# Patient Record
(demographics unavailable — no encounter records)

---

## 2017-03-02 NOTE — EDM.PDOC
ED HPI GI/ABDOMINAL





- General


Chief Complaint: Abdominal Pain


Stated Complaint: POSSIBLE INFECTED GLADBLADDER


Time Seen by Provider: 03/02/17 16:00


Source of Information: Reports: Patient, Old records


History Limitations: Reports: No limitations





- History of Present Illness


INITIAL COMMENTS - FREE TEXT/NARRATIVE: 


HISTORY AND PHYSICAL:





History of present illness:


[Patient comes to the emergency room at the request of Brittny Cedeño NP at 

[facility] where patient presented with diffuse abdominal pain for the 

past couple of days. She had an ultrasound of her abdomen which showed gall 

bladder thickening and inflammation. Surgeon was contacted by the clinic who 

recommended patient followup in the ER for blood work and abdominal CT scan. 

Patient shows up approximately 4 hours after leaving the clinic for evaluation 

in the ER.


She's had diffuse abdominal pain which is worse in the right upper quadrant. She

's been feeling ill on and off for the past several weeks but really worsened 

over the past couple of days. She's not had fever or chills. She was prescribed 

Zofran this morning which greatly improved her feelings of nausea. She has 

taken small sips of water since she's been home the clinic today but has had no 

food. One episode of diarrhea today. Denies burning with urination, urinary 

frequency and hematuria.]





Review of systems: 


As per history of present illness and below otherwise all systems reviewed and 

negative.





Past medical history: 


As per history of present illness and as reviewed below otherwise 

noncontributory.





Surgical history: 


As per history of present illness and as reviewed below otherwise 

noncontributory.





Social history: 


No reported history of drug or alcohol abuse.





Family history: 


As per history of present illness and as reviewed below otherwise 

noncontributory.





Physical exam:


HEENT: Atraumatic, normocephalic. Oral mucous membranes are pink and moist. 

throat clear, neck supple, no lymphadenopathy.


Lungs: Clear to auscultation, no wheezing, crackles or rales. breath sounds 

equal bilaterally.


Heart: S1S2, regular rate and rhythm., negative for clicks, rubs, or JVD.


Abdomen: Bowel sounds are normoactive throughout. Abdomen is soft and 

nondistended. Diffuse abdominal tenderness with palpation significantly worse 

to right upper quadrant. Positive Lopez's sign.  Negative for costovertebral 

tenderness. No guarding masses or rebound.


Pelvis: Stable nontender.


Genitourinary: Deferred.


Rectal: Deferred.


Extremities: Atraumatic, negative for cords or calf pain. Cyanosis or edema to 

feet or lower legs. Neurovascular unremarkable.


Neuro: Awake, alert, oriented.  Motor and sensory unremarkable throughout. Exam 

nonfocal.





Diagnostics:


[CBC, CMP, urinalysis, amylase, lipase, urine pregnancy, CT abdomen and pelvis]





Impression: 


[] Right upper Quadrant abdominal pain


UTI]





Plan:


[Labs and CT report are reviewed with patient and Dr. Lyubov Goodman. Abdominal 

CT shows a significantly distended gallbladder and a dilated cystic duct 

measuring up to 1.5 cm. Dr. Lombardo recommends HIDA scan as an outpatient 

tomorrow followup with her in clinic next week. Will start patient on Cipro 500 

mg twice a day for the next 7 days for UTI. Patient is in agreement with today'

s plan. All questions are answered and concerns are addressed.]





Definitive disposition and diagnosis as appropriate pending reevaluation and 

review of above.











- Related Data


Allergies/ADRs: 


 Allergies











Allergy/AdvReac Type Severity Reaction Status Date / Time


 


aspirin Allergy  Muscle Verified 03/02/17 15:58





   Weakness  











Home Meds: 


 Home Meds





. [No Known Home Meds]  12/17/14 [History]











Past Medical History





- Past Health History


Medical/Surgical History: Denies Medical/Surgical History


HEENT History: Reports: None


Cardiovascular History: Reports: None


Respiratory History: Reports: None


Gastrointestinal History: Reports: None


Genitourinary History: Reports: None


OB/GYN History: Reports: None


Musculoskeletal History: Reports: None


Neurological History: Reports: None


Psychiatric History: Reports: None


Endocrine/Metabolic History: Reports: None


Hematologic History: Reports: None


Immunologic History: Reports: None


Oncologic (Cancer) History: Reports: None


Dermatologic History: Reports: None





- Infectious Disease History


Infectious Disease History: Reports: Chicken pox, Hepatitis A





- Past Surgical History


Head Surgeries/Procedures: Reports: None





Social & Family History





- Family History


Family Medical History: Noncontributory





- Tobacco Use


Smoking Status *Q: Current Some Day Smoker


Years of Tobacco use: 8


Packs/Tins Daily: 0.2


Second Hand Smoke Exposure: Yes





- Caffeine Use


Caffeine Use: Reports: None





- Alcohol Use


Days Per Week of Alcohol Use: 2


Number of Drinks Per Day: 4


Total Drinks Per Week: 8





- Recreational Drug Use


Recreational Drug Use: No





ED ROS GENERAL





- Review of Systems


Review Of Systems: ROS reveals no pertinent complaints other than HPI.





ED EXAM, GI/ABD





- Physical Exam


Exam: See Below





Course





- Vital Signs


Last Recorded V/S: 


 Last Vital Signs











Temp  98.7 F   03/02/17 19:16


 


Pulse  73   03/02/17 19:16


 


Resp  16   03/02/17 19:16


 


BP  117/79   03/02/17 19:16


 


Pulse Ox  99   03/02/17 19:16














- Orders/Labs/Meds


Orders: 


 Active Orders 24 hr











 Category Date Time Status


 


 Abdomen Pelvis w Cont [CT] Stat Exams  03/02/17 16:00 Taken


 


 Saline Lock Insert [OM.PC] Stat Oth  03/02/17 15:59 Ordered











Labs: 


 Laboratory Tests











  03/02/17 03/02/17 03/02/17 Range/Units





  16:10 16:10 16:29 


 


WBC    5.14  (4.0-11.0)  K/uL


 


RBC    4.61  (4.30-5.90)  M/uL


 


Hgb    13.1  (12.0-16.0)  g/dL


 


Hct    39.7  (36.0-46.0)  %


 


MCV    86.1  (80.0-98.0)  fL


 


MCH    28.4  (27.0-32.0)  pg


 


MCHC    33.0  (31.0-37.0)  g/dL


 


RDW Std Deviation    40.1  (28.0-62.0)  fl


 


RDW Coeff of Thompson    13  (11.0-15.0)  %


 


Plt Count    167  (150-400)  K/uL


 


MPV    11.40  (7.40-12.00)  fL


 


Neut % (Auto)    63.3  (48.0-80.0)  %


 


Lymph % (Auto)    28.0  (16.0-40.0)  %


 


Mono % (Auto)    6.8  (0.0-15.0)  %


 


Eos % (Auto)    1.9  (0.0-7.0)  %


 


Baso % (Auto)    0.0  (0.0-1.5)  %


 


Neut #    3.3  (1.4-5.7)  K/uL


 


Lymph #    1.4  (0.6-2.4)  K/uL


 


Mono #    0.4  (0.0-0.8)  K/uL


 


Eos #    0.1  (0.0-0.7)  K/uL


 


Baso #    0.0  (0.0-0.1)  K/uL


 


Nucleated RBC %    0.0  /100WBC


 


Nucleated RBCs #    0  K/uL


 


Sodium     (136-146)  mmol/L


 


Potassium     (3.5-5.1)  mmol/L


 


Chloride     ()  mmol/L


 


Carbon Dioxide     (21-31)  mmol/L


 


BUN     (6.0-23.0)  mg/dL


 


Creatinine     (0.6-1.5)  mg/dL


 


Est Cr Clr Drug Dosing     mL/min


 


Estimated GFR (MDRD)     ml/min


 


Glucose     ()  mg/dL


 


Calcium     (8.8-10.8)  mg/dL


 


Total Bilirubin     (0.1-1.5)  mg/dL


 


AST     (5-40)  IU/L


 


ALT     (8-54)  IU/L


 


Alkaline Phosphatase     ()  


 


Total Protein     (6.0-8.0)  g/dL


 


Albumin     (3.5-5.0)  g/dL


 


Globulin     (2.0-3.5)  g/dL


 


Albumin/Globulin Ratio     (1.3-2.8)  


 


Amylase     (10-90)  U/L


 


Lipase     (7-80)  U/L


 


Urine Color   YELLOW   


 


Urine Appearance   CLEAR   


 


Urine pH   7.0   (5.0-8.0)  


 


Ur Specific Gravity   1.020   (1.001-1.035)  


 


Urine Protein   NEGATIVE   (NEGATIVE)  mg/dL


 


Urine Glucose (UA)   NEGATIVE   (NEGATIVE)  mg/dL


 


Urine Ketones   NEGATIVE   (NEGATIVE)  mg/dL


 


Urine Occult Blood   MODERATE   (NEGATIVE)  


 


Urine Nitrite   NEGATIVE   (NEGATIVE)  


 


Urine Bilirubin   NEGATIVE   (NEGATIVE)  


 


Urine Urobilinogen   2.0 H   (<2.0)  EU/dL


 


Ur Leukocyte Esterase   NEGATIVE   (NEGATIVE)  


 


Urine RBC   0-2   (0-2/HPF)  


 


Urine WBC   1-2   (0-5/HPF)  


 


Ur Epithelial Cells   MODERATE   (NONE-FEW)  


 


Urine Bacteria   2+ H   (NEGATIVE)  


 


Urine HCG, Qual  NEGATIVE    (NEGATIVE)  














  03/02/17 Range/Units





  16:29 


 


WBC   (4.0-11.0)  K/uL


 


RBC   (4.30-5.90)  M/uL


 


Hgb   (12.0-16.0)  g/dL


 


Hct   (36.0-46.0)  %


 


MCV   (80.0-98.0)  fL


 


MCH   (27.0-32.0)  pg


 


MCHC   (31.0-37.0)  g/dL


 


RDW Std Deviation   (28.0-62.0)  fl


 


RDW Coeff of Thompson   (11.0-15.0)  %


 


Plt Count   (150-400)  K/uL


 


MPV   (7.40-12.00)  fL


 


Neut % (Auto)   (48.0-80.0)  %


 


Lymph % (Auto)   (16.0-40.0)  %


 


Mono % (Auto)   (0.0-15.0)  %


 


Eos % (Auto)   (0.0-7.0)  %


 


Baso % (Auto)   (0.0-1.5)  %


 


Neut #   (1.4-5.7)  K/uL


 


Lymph #   (0.6-2.4)  K/uL


 


Mono #   (0.0-0.8)  K/uL


 


Eos #   (0.0-0.7)  K/uL


 


Baso #   (0.0-0.1)  K/uL


 


Nucleated RBC %   /100WBC


 


Nucleated RBCs #   K/uL


 


Sodium  141  (136-146)  mmol/L


 


Potassium  3.8  (3.5-5.1)  mmol/L


 


Chloride  113 H  ()  mmol/L


 


Carbon Dioxide  20 L  (21-31)  mmol/L


 


BUN  15  (6.0-23.0)  mg/dL


 


Creatinine  0.7  (0.6-1.5)  mg/dL


 


Est Cr Clr Drug Dosing  109.01  mL/min


 


Estimated GFR (MDRD)  > 60.0  ml/min


 


Glucose  96  ()  mg/dL


 


Calcium  8.8  (8.8-10.8)  mg/dL


 


Total Bilirubin  0.4  (0.1-1.5)  mg/dL


 


AST  13  (5-40)  IU/L


 


ALT  16  (8-54)  IU/L


 


Alkaline Phosphatase  79  ()  


 


Total Protein  7.0  (6.0-8.0)  g/dL


 


Albumin  4.0  (3.5-5.0)  g/dL


 


Globulin  3.0  (2.0-3.5)  g/dL


 


Albumin/Globulin Ratio  1.3  (1.3-2.8)  


 


Amylase  38  (10-90)  U/L


 


Lipase  17  (7-80)  U/L


 


Urine Color   


 


Urine Appearance   


 


Urine pH   (5.0-8.0)  


 


Ur Specific Gravity   (1.001-1.035)  


 


Urine Protein   (NEGATIVE)  mg/dL


 


Urine Glucose (UA)   (NEGATIVE)  mg/dL


 


Urine Ketones   (NEGATIVE)  mg/dL


 


Urine Occult Blood   (NEGATIVE)  


 


Urine Nitrite   (NEGATIVE)  


 


Urine Bilirubin   (NEGATIVE)  


 


Urine Urobilinogen   (<2.0)  EU/dL


 


Ur Leukocyte Esterase   (NEGATIVE)  


 


Urine RBC   (0-2/HPF)  


 


Urine WBC   (0-5/HPF)  


 


Ur Epithelial Cells   (NONE-FEW)  


 


Urine Bacteria   (NEGATIVE)  


 


Urine HCG, Qual   (NEGATIVE)  











Meds: 


Medications














Discontinued Medications














Generic Name Dose Route Start Last Admin





  Trade Name Freq  PRN Reason Stop Dose Admin


 


Iopamidol  100 ml  03/02/17 17:56  03/02/17 17:57





  Isovue-370 (76%)  IV  03/02/17 17:57  100 ml





  ONETIME STA   Administration


 


Sodium Chloride  10 ml  03/02/17 15:59  





  Saline Flush  FLUSH   





  ASDIRECTED PRN   





  Keep Vein Open   


 


Sodium Chloride  2.5 ml  03/02/17 15:59  





  Saline Flush  FLUSH   





  ASDIRECTED PRN   





  Keep Vein Open   














Departure





- Departure


Time of Disposition: 19:00


Disposition: Home, Self-Care 01


Condition: good


Clinical Impression: 


Abdominal pain


Qualifiers:


 Abdominal location: right upper quadrant Qualified Code(s): R10.11 - Right 

upper quadrant pain





UTI (urinary tract infection)


Qualifiers:


 Urinary tract infection type: site unspecified Hematuria presence: without 

hematuria Qualified Code(s): N39.0 - Urinary tract infection, site not specified





Instructions:  Urinary Tract Infection, Adult, Easy-to-Read, Abdominal Pain, 

Adult, Easy-to-Read


Referrals: 


PCP,None [Primary Care Provider] - 


Forms:  ED Department Discharge


Additional Instructions: 


The following information is given to patients seen in the emergency department 

who are being discharged to home. This information is to outline your options 

for follow-up care. We provide all patients seen in our emergency department 

with a follow-up referral.





The need for follow-up, as well as the timing and circumstances, are variable 

depending upon the specifics of your emergency department visit.





If you don't have a primary care physician on staff, we will provide you with a 

referral. We always advise you to contact your personal physician following an 

emergency department visit to inform them of the circumstance of the visit and 

for follow-up with them and/or the need for any referrals to a consulting 

specialist.





The emergency department will also refer you to a specialist when appropriate. 

This referral assures that you have the opportunity for follow-up care with a 

specialist. All of these measure are taken in an effort to provide you with 

optimal care, which includes your follow-up.





Under all circumstances we always encourage you to contact your private 

physician who remains a resource for coordinating your care. When calling for 

follow-up care, please make the office aware that this follow-up is from your 

recent emergency room visit. If for any reason you are refused follow-up, 

please contact the [facility]  emergency 

department at [phone] and asked to speak to the emergency department 

charge nurse.








[facility]


Specialty Care- General Surgery


[address]


[address] [place] [address], [address]


[place] [address]


Phone: [phone]


Fax: [phone]








Call the above listed phone number tomorrow to schedule an appointment to 

followup with Dr. Lyubov Goodman about your gallbladder next week. You have been 

given an outpatient prescription for a HIDA scan. Take this to radiology 

registration desk tomorrow. Take Cipro twice daily as prescribed. Return to ER 

as needed and as discussed.





- My Orders


Last 24 Hours: 


My Active Orders





03/02/17 15:59


Saline Lock Insert [OM.PC] Stat 





03/02/17 16:00


Abdomen Pelvis w Cont [CT] Stat 














- Assessment/Plan


Last 24 Hours: 


My Active Orders





03/02/17 15:59


Saline Lock Insert [OM.PC] Stat 





03/02/17 16:00


Abdomen Pelvis w Cont [CT] Stat

## 2017-03-03 NOTE — CT
EXAM DATE: 17



PATIENT'S AGE: 31



Patient: WALE WHITFIELD



Facility: [place], [place]

Patient ID: [id number]

Site Patient ID: [id number].

Site Accession #: [id number].

: 1985

Study: CT Abdomen/Pelvis [id number]/2/2017 6:00:02 PM

Ordering Physician: Doctor Temp



Final Report: 

INDICATION:

Upper abdominal pain. Right flank pain 



TECHNIQUE:

CT abdomen and pelvis acquired with IV contrast.



COMPARISON:

None available 



FINDINGS:

Lower chest: Unremarkable. 

Liver: An irregular low-attenuation area in the anterior segment of the right 
hepatic lobe, with very subtle slightly increased adjacent density, measuring 
up to 4.8 x 3.5 centimeters on the coronal reformats, not well evaluated. 

Spleen: Unremarkable. 

Pancreas: Unremarkable. 

Gallbladder and bile ducts: A significantly distended gallbladder. Dilated 
cystic duct measuring up to 1.5 centimeters proximally. 

Adrenal glands: Unremarkable. 

Kidneys: Unremarkable. 

GI tract: No high-grade mechanical bowel obstruction. Fluid-filled small bowel 
segments are nonspecific. No evidence of appendicitis. No significant 
pericolonic changes. 

Vascular structures: Unremarkable. 

Lymph nodes: Unremarkable. 

Miscellaneous: Unremarkable. No free air or significant free fluid. 

Pelvic Organs: Unremarkable. 

Bones: Unremarkable for age. 



IMPRESSION:

No evidence of appendicitis, diverticulitis or high-grade mechanical bowel 
obstruction. Fluid-filled small bowel segments are nonspecific. Correlate for 
mild enteritis. 

Significantly distended gallbladder and dilated cystic duct. Recommend further 
sonographic evaluation. 

An ill-defined irregular hepatic lesion, not well evaluated. This could 
represent an FNH, although other etiologies are not excluded. Recommend 
followup evaluation with contrast MRI.



Dictated by Shawn Henderson MD @ 3/2/2017 6:23:07 PM



Dictated by: Shawn Henderson MD @ 2017 18:23:19

(Electronic Signature)



Report Signed by Proxy and Original Signed Document filed in the

Medical Record.
[facility]

## 2017-03-07 NOTE — NM
EXAMINATION: Nuclear medicine hepatobiliary study (HIDA) 

 

HISTORY: Right upper quadrant pain.

 

PROCEDURE:

Following intravenous administration of  3.9  mCi of technetium 99m Choletec, dynamic images were ob
tained up to one-hour post injection.

 

FINDINGS:

The initial dynamic images demonstrates clearance of the tracer from the blood pool with the prompt 
tracer uptake by the liver. By 60 minutes no activity is noted within the gallbladder. There is acti
vity noted within the small bowel by 15 minutes.

 

IMPRESSION:

1. No scintigraphic evidence of a patent cystic duct. Correlate clinically for acute cholecystitis.

## 2017-03-10 NOTE — PCM.PREANE
Preanesthetic Assessment





- ANESTHESIA/TRANSFUSION/FAMILY HX


Anesthesia/Transfusion History: No Prior Anesthesia


Family History of Anesthesia Reaction: No





- PHYSICAL ASSESSMENT


Height: 1.68 m


Weight: 85.729 kg


ASA Class: 1





- ALLERGIES


Allergies/Adverse Reactions: 


 Allergies











Allergy/AdvReac Type Severity Reaction Status Date / Time


 


aspirin Allergy  Tachycardia Verified 03/09/17 08:07














- BLOOD


Blood Available: No





- ANESTHESIA PLAN


Preop Beta Blocker: No


Anesthesia Type Planned: general anesthesia





PreAnesthesia Questionnaire





- Past Health History


Medical/Surgical History: Denies Medical/Surgical History


HEENT History: Reports: None


Cardiovascular History: Reports: None


Respiratory History: Reports: None


Gastrointestinal History: Reports: Chronic diarrhea, GERD


Genitourinary History: Reports: None


OB/GYN History: Reports: None


Musculoskeletal History: Reports: None


Neurological History: Reports: None


Psychiatric History: Reports: None


Endocrine/Metabolic History: Reports: None


Hematologic History: Reports: None


Immunologic History: Reports: None


Oncologic (Cancer) History: Reports: None


Dermatologic History: Reports: None





- Infectious Disease History


Infectious Disease History: Reports: Chicken pox, Hepatitis A





- Past Surgical History


Head Surgeries/Procedures: Reports: None





- SUBSTANCE USE


Smoking Status *Q: Current Every Day Smoker


Tobacco Use Within Last Twelve Months: Cigarettes


Second Hand Smoke Exposure: Yes


Days Per Week of Alcohol Use: 2


Number of Drinks Per Day: 4


Total Drinks Per Week: 8


Recreational Drug Use History: No





- HOME MEDS


Home Medications: 


 Home Meds





Ondansetron [Zofran ODT] 4 mg SL ASDIRECTED PRN 03/09/17 [History]

## 2017-03-13 NOTE — PCM.PREANE
Preanesthetic Assessment





- ANESTHESIA/TRANSFUSION/FAMILY HX


Anesthesia/Transfusion History: No Prior Anesthesia


Family History of Anesthesia Reaction: No


Other Intubation History Comment: no history of intubation





- REVIEW OF SYSTEMS


Constitutional: Reports: no symptoms


CNS: Reports: no symptoms


Respiratory: Reports: no symptoms


Cardiovascular: Reports: no symptoms


Other: Reports: none





- PHYSICAL ASSESSMENT


O2 Sat by Pulse Oximetry: 99


RR: 16


Vital Signs: 





 Last Vital Signs











Temp  37.1 C   03/13/17 06:49


 


Pulse  74   03/13/17 06:49


 


Resp  16   03/13/17 06:49


 


BP  120/58 L  03/13/17 06:49


 


Pulse Ox  99   03/13/17 06:49











Height: 1.68 m


Weight: 85.729 kg


ASA Class: 2


Mental Status: alert & oriented x3


Airway Class: Mallampati = 2


Dentition: Reports: normal dentition


Thyro-Mental Finger Breadths: 3


Mouth Opening Finger Breadths: 3


ROM/Head Extension: full


Respiratory Status: lungs clear to auscultation bilaterally


Cardiovascular Status: regular rate & rhythm, normal S1, S2, no murmur, blood 

pressure WNL





- ALLERGIES


Allergies/Adverse Reactions: 


 Allergies











Allergy/AdvReac Type Severity Reaction Status Date / Time


 


aspirin Allergy  Tachycardia Verified 03/09/17 08:07














- BLOOD


Blood Available: No





- ANESTHESIA PLAN


Preop Beta Blocker: No


Anesthesia Type Planned: general anesthesia





- ACKNOWLEDGEMENTS


Pt an appropriate candidate for the planned anesthesia: Yes


Alternatives and risks of anesthesia discussed w pt/guardian: Yes


Pt/Guardian understands and agree with anesthesia plan: Yes





PreAnesthesia Questionnaire





- Past Health History


Medical/Surgical History: Denies Medical/Surgical History


HEENT History: Reports: None


Cardiovascular History: Reports: None


Respiratory History: Reports: None


Gastrointestinal History: Reports: Chronic diarrhea, GERD


Genitourinary History: Reports: None


OB/GYN History: Reports: None


Musculoskeletal History: Reports: None


Neurological History: Reports: None


Psychiatric History: Reports: None


Endocrine/Metabolic History: Reports: Obesity/BMI 30+


Hematologic History: Reports: None


Immunologic History: Reports: None


Oncologic (Cancer) History: Reports: None


Dermatologic History: Reports: None





- Infectious Disease History


Infectious Disease History: Reports: Chicken pox, Hepatitis A





- Past Surgical History


Head Surgeries/Procedures: Reports: None





- SUBSTANCE USE


Smoking Status *Q: Light Tobacco Smoker


Tobacco Use Within Last Twelve Months: Cigarettes


Second Hand Smoke Exposure: Yes


Days Per Week of Alcohol Use: 2


Number of Drinks Per Day: 4


Total Drinks Per Week: 8


Recreational Drug Use History: No





- HOME MEDS


Home Medications: 


 Home Meds





Ondansetron [Zofran ODT] 4 mg SL ASDIRECTED PRN 03/09/17 [History]











- CURRENT (IN HOUSE) MEDS


Current Meds: 





 Current Medications





Lactated Ringer's (Ringers, Lactated)  1,000 mls @ 125 mls/hr IV ASDIRECTED [facility]


   Last Admin: 03/13/17 06:52 Dose:  125 mls/hr

## 2017-03-13 NOTE — OR
SURGEON:

MICHAEL NICHOLAS MD

 

DATE OF PROCEDURE:  03/13/2017

 

PREOPERATIVE DIAGNOSIS:

Symptomatic cholelithiasis.

 

POSTOPERATIVE DIAGNOSES:

Chronic cholecystitis, cholelithiasis, hydrops of the gallbladder.

 

OPERATION PERFORMED:

Laparoscopic cholecystectomy.

 

SEDATION:

General.

 

FINDINGS:

Enlarged gallbladder with large stone in the infundibulum as well as hydrops.

 

COMPLICATIONS:

None.

 

ESTIMATED BLOOD LOSS:

5 mL.

 

INDICATIONS:

The patient is a 31-year-old female with a 1-month history of right upper

quadrant pain as well as postprandial nausea and diarrhea.  A full workup was

completed that revealed a large stone impacted within the cystic duct.  Given

this finding, it was pertinent that we remove the gallbladder before the patient

developed complications including acute infection, gangrene, or necrosis.  The

patient and I discussed the procedure in the office.  I explained both the

laparoscopic and open approaches to removing the gallbladder.  I explained that

should I be unable to perform the procedure laparoscopically, I would be

converting to open.  I explained to her that given the location of the stone

that she had a higher chance of converting to open.  We discussed the other

risks including bleeding, infection, or damage to surrounding structures.  The

patient verbalized understanding and wished to proceed.

 

DESCRIPTION OF PROCEDURE:

The patient was brought to the operating room and placed on the operating room

table in supine position.  A time-out was completed verifying the patient's

name, age, date of birth, allergies, and procedure to be performed.  General

endotracheal anesthesia was induced.  The patient's left arm was tucked at her

side and the abdomen was prepped and draped in usual standard fashion.  



0.5% lidocaine was used to anesthetize the infraumbilical fold.  I then took an 
11

blade and made an incision along the infraumbilical fold.  Cautery was used to

dissect down to the subcutaneous fat and S retractors were used for further

dissection down to the level of the fascia.  The fascia was elevated with 2

Kocher's and incised.  The peritoneum was then elevated in a similar fashion

using hemostats and incised.  A large amount of fat was encountered upon

entering the abdomen.  Using my finger, I explored the area and encountered

bowel.  Because of this, I was confident that I had entered the abdomen and

placed my 12 mm blunt-tipped trocar through the incision.  The abdomen was then

insufflated to a pressure of 12-15 mmHg.  A 5 mm 30-degree scope was

inserted in the abdomen and used to inspect the area underneath my incision.

The bowel and fat below the incision all appeared intact with no evidence of

damage.  I then placed 3 more ports within the abdomen.  The first 5 mm port was

in the epigastric area.  The next two 5 mm ports were then placed along the

right subcostal margin approximately 2 fingerbreadths down.  One was in the

midclavicular line, the other was along the lateral edge.  Next, the gallbladder

was retracted cephalad to allow visualization of the gallbladder itself.  I

followed the gallbladder body down to the infundibulum.  A very large round

stone was noted to be in the infundibulum at the area consistent with the cystic

duct. I further inspected the area and noted the common bile duct medial to the

area I was going to work.  The peritoneum was gently dissected free from the

infundibulum of the gallbladder using hook cautery and gentle blunt dissection.

This allowed me to better visualize the anatomy of the proximal gallbladder.

Once I had dissected the base of the gallbladder free from the liver bed, I was

then able to adequately identify the cystic duct and cystic artery.  Once I had

achieved a critical view, the cystic artery and cystic ducts were doubly clipped

proximally and singly clipped distally and then transected.  Next, the

gallbladder was removed from the liver bed using electrocautery.  This was

extremely difficult given how enlarged the gallbladder was.  At the beginning of

the case, I had previously aspirated the gallbladder contents and obtained a

large amount of clear white bile.  This allowed me to grasp the gallbladder and

retract it cephalad.  Because of this maneuver, I was able to manipulate the

gallbladder to dissect it off the gallbladder fossa.  Once the gallbladder was

removed from the liver bed, it was placed into an EndoCatch bag and removed

through the infraumbilical port.  The gallbladder was then inspected on the back

table and appeared to be intact with one small hole at the dome of the

gallbladder.  After the gallbladder was successfully removed, an 11 mm trocar

was placed into the abdomen.  The liver edge was visualized.  There was no

evidence of acute bleeding and the area appeared hemostatic.  The abdomen was

irrigated with 2 L of normal saline until it ran clear.  Next, the ports were

removed under direct visualization and no bleeding was noted.  The 11 mm port

was removed from the infraumbilical incision.  The fascia was then closed with

figure-of-eight interrupted 0 Vicryl sutures.  The subcutaneous layer was closed

with interrupted 3-0 Vicryl.  The skin of the infraumbilical incision was closed

with a running 4-0 Monocryl suture.  The remainder of the port sites were then

closed with interrupted 4-0 Monocryl sutures.  The wounds were dressed with

Steri-Strips and sterile dressings.  The patient tolerated the procedure well.

Sponge and instrument counts were correct at the end of the case.  The patient

was extubated in the operating room without event and transferred to recovery

room in stable condition.

 

 

ALIYA MORRIS

DD:  03/13/2017 12:46:15

DT:  03/13/2017 15:15:27

Job #:  [id number]

[email or web address]

## 2017-03-13 NOTE — PCM.POSTAN
POST ANESTHESIA ASSESSMENT





- MENTAL STATUS


Mental Status: alert, oriented





- RESPIRATORY


Respiratory Status: respiratory rate WNL, airway patent, O2 saturation stable





- CARDIOVASCULAR


CV Status: pulse rate WNL, blood pressure stable





- GASTROINTESTINAL


GI Status: no symptoms





- POST OP HYDRATION


Hydration Status: adequate & stable

## 2017-03-13 NOTE — PCM.OPNOTE
- General Post-Op/Procedure Note


Date of Surgery/Procedure: 03/13/17


Operative Procedure(s): Laparoscopic cholecystectomy


Findings: 





Enlarged gallbladder with large obstructing stone in the infundibulum and well 

as hydrops


Pre Op Diagnosis: Cholelithiasis


Post-Op Diagnosis: same


Anesthesia Technique: General ET tube


Primary Surgeon: Lyubov Goodman


Pathology: 





gallbladder


EBL in mLs: 5


Condition: Good